# Patient Record
Sex: FEMALE | Race: BLACK OR AFRICAN AMERICAN | NOT HISPANIC OR LATINO
[De-identification: names, ages, dates, MRNs, and addresses within clinical notes are randomized per-mention and may not be internally consistent; named-entity substitution may affect disease eponyms.]

---

## 2017-01-03 ENCOUNTER — APPOINTMENT (OUTPATIENT)
Dept: ENDOCRINOLOGY | Facility: CLINIC | Age: 22
End: 2017-01-03

## 2017-05-16 ENCOUNTER — APPOINTMENT (OUTPATIENT)
Dept: ENDOCRINOLOGY | Facility: CLINIC | Age: 22
End: 2017-05-16

## 2017-05-16 VITALS
HEIGHT: 65.5 IN | WEIGHT: 226 LBS | SYSTOLIC BLOOD PRESSURE: 122 MMHG | HEART RATE: 85 BPM | DIASTOLIC BLOOD PRESSURE: 85 MMHG | BODY MASS INDEX: 37.2 KG/M2

## 2017-05-19 ENCOUNTER — MEDICATION RENEWAL (OUTPATIENT)
Age: 22
End: 2017-05-19

## 2017-05-19 LAB
ANION GAP SERPL CALC-SCNC: 17 MMOL/L
BUN SERPL-MCNC: 10 MG/DL
CALCIUM SERPL-MCNC: 9.6 MG/DL
CHLORIDE SERPL-SCNC: 101 MMOL/L
CO2 SERPL-SCNC: 20 MMOL/L
CREAT SERPL-MCNC: 0.79 MG/DL
GLUCOSE SERPL-MCNC: 80 MG/DL
HBA1C MFR BLD HPLC: 5.4 %
POTASSIUM SERPL-SCNC: 4.5 MMOL/L
SODIUM SERPL-SCNC: 138 MMOL/L
TSH SERPL-ACNC: 20.04 UIU/ML

## 2017-06-23 ENCOUNTER — APPOINTMENT (OUTPATIENT)
Dept: ENDOCRINOLOGY | Facility: CLINIC | Age: 22
End: 2017-06-23

## 2017-07-27 ENCOUNTER — CHART COPY (OUTPATIENT)
Age: 22
End: 2017-07-27

## 2017-08-09 ENCOUNTER — CLINICAL ADVICE (OUTPATIENT)
Age: 22
End: 2017-08-09

## 2017-08-22 ENCOUNTER — APPOINTMENT (OUTPATIENT)
Dept: ENDOCRINOLOGY | Facility: CLINIC | Age: 22
End: 2017-08-22

## 2017-08-30 ENCOUNTER — APPOINTMENT (OUTPATIENT)
Dept: ENDOCRINOLOGY | Facility: CLINIC | Age: 22
End: 2017-08-30

## 2017-09-25 ENCOUNTER — MEDICATION RENEWAL (OUTPATIENT)
Age: 22
End: 2017-09-25

## 2017-10-05 ENCOUNTER — APPOINTMENT (OUTPATIENT)
Dept: ENDOCRINOLOGY | Facility: CLINIC | Age: 22
End: 2017-10-05

## 2017-11-02 ENCOUNTER — APPOINTMENT (OUTPATIENT)
Dept: ENDOCRINOLOGY | Facility: CLINIC | Age: 22
End: 2017-11-02

## 2018-01-12 ENCOUNTER — RESULT REVIEW (OUTPATIENT)
Age: 23
End: 2018-01-12

## 2018-02-02 ENCOUNTER — APPOINTMENT (OUTPATIENT)
Dept: ENDOCRINOLOGY | Facility: CLINIC | Age: 23
End: 2018-02-02
Payer: MEDICAID

## 2018-02-02 VITALS
DIASTOLIC BLOOD PRESSURE: 79 MMHG | HEIGHT: 65.5 IN | BODY MASS INDEX: 41.48 KG/M2 | HEART RATE: 64 BPM | WEIGHT: 252 LBS | SYSTOLIC BLOOD PRESSURE: 123 MMHG

## 2018-02-02 VITALS — WEIGHT: 252 LBS | HEIGHT: 65.5 IN

## 2018-02-02 DIAGNOSIS — Z23 ENCOUNTER FOR IMMUNIZATION: ICD-10-CM

## 2018-02-02 DIAGNOSIS — E55.9 VITAMIN D DEFICIENCY, UNSPECIFIED: ICD-10-CM

## 2018-02-02 PROCEDURE — G0008: CPT

## 2018-02-02 PROCEDURE — 97802 MEDICAL NUTRITION INDIV IN: CPT

## 2018-02-02 PROCEDURE — 90686 IIV4 VACC NO PRSV 0.5 ML IM: CPT

## 2018-02-02 PROCEDURE — 99213 OFFICE O/P EST LOW 20 MIN: CPT | Mod: 25

## 2018-02-02 RX ORDER — INFLUENZA A VIRUS A/VICTORIA/4897/2022 IVR-238 (H1N1) ANTIGEN (FORMALDEHYDE INACTIVATED), INFLUENZA A VIRUS A/DARWIN/9/2021 SAN-010 (H3N2) ANTIGEN (FORMALDEHYDE INACTIVATED), INFLUENZA B VIRUS B/PHUKET/3073/2013 ANTIGEN (FORMALDEHYDE INACTIVATED), AND INFLUENZA B VIRUS B/MICHIGAN/01/2021 ANTIGEN (FORMALDEHYDE INACTIVATED) 15; 15; 15; 15 UG/.5ML; UG/.5ML; UG/.5ML; UG/.5ML
0.5 INJECTION, SUSPENSION INTRAMUSCULAR
Qty: 0.5 | Refills: 0 | Status: ACTIVE | COMMUNITY
Start: 2018-02-02

## 2018-05-02 ENCOUNTER — APPOINTMENT (OUTPATIENT)
Dept: ENDOCRINOLOGY | Facility: CLINIC | Age: 23
End: 2018-05-02
Payer: MEDICAID

## 2018-05-02 VITALS
HEIGHT: 65.5 IN | WEIGHT: 248 LBS | DIASTOLIC BLOOD PRESSURE: 84 MMHG | HEART RATE: 79 BPM | SYSTOLIC BLOOD PRESSURE: 124 MMHG | BODY MASS INDEX: 40.82 KG/M2

## 2018-05-02 PROCEDURE — 99213 OFFICE O/P EST LOW 20 MIN: CPT

## 2018-05-04 LAB
25(OH)D3 SERPL-MCNC: 30.5 NG/ML
ANION GAP SERPL CALC-SCNC: 16 MMOL/L
BUN SERPL-MCNC: 6 MG/DL
CALCIUM SERPL-MCNC: 10 MG/DL
CHLORIDE SERPL-SCNC: 103 MMOL/L
CO2 SERPL-SCNC: 21 MMOL/L
CREAT SERPL-MCNC: 0.94 MG/DL
GLUCOSE SERPL-MCNC: 86 MG/DL
HBA1C MFR BLD HPLC: 5.4 %
POTASSIUM SERPL-SCNC: 4.3 MMOL/L
SODIUM SERPL-SCNC: 140 MMOL/L
T4 FREE SERPL-MCNC: 1.7 NG/DL
TSH SERPL-ACNC: 0.41 UIU/ML

## 2018-06-11 ENCOUNTER — EMERGENCY (EMERGENCY)
Facility: HOSPITAL | Age: 23
LOS: 1 days | Discharge: HOME | End: 2018-06-11
Admitting: EMERGENCY MEDICINE
Payer: COMMERCIAL

## 2018-06-11 VITALS
HEART RATE: 94 BPM | DIASTOLIC BLOOD PRESSURE: 80 MMHG | RESPIRATION RATE: 18 BRPM | OXYGEN SATURATION: 98 % | WEIGHT: 250.45 LBS | SYSTOLIC BLOOD PRESSURE: 117 MMHG | TEMPERATURE: 100 F

## 2018-06-11 LAB
RAPID RVP RESULT: DETECTED
RV+EV RNA SPEC QL NAA+PROBE: DETECTED
S PYO AG SPEC QL IA: NEGATIVE — SIGNIFICANT CHANGE UP

## 2018-06-11 PROCEDURE — 87081 CULTURE SCREEN ONLY: CPT

## 2018-06-11 PROCEDURE — 87581 M.PNEUMON DNA AMP PROBE: CPT

## 2018-06-11 PROCEDURE — 87798 DETECT AGENT NOS DNA AMP: CPT

## 2018-06-11 PROCEDURE — 87880 STREP A ASSAY W/OPTIC: CPT

## 2018-06-11 PROCEDURE — 87486 CHLMYD PNEUM DNA AMP PROBE: CPT

## 2018-06-11 PROCEDURE — 87633 RESP VIRUS 12-25 TARGETS: CPT

## 2018-06-11 PROCEDURE — 99283 EMERGENCY DEPT VISIT LOW MDM: CPT

## 2018-06-11 RX ORDER — IBUPROFEN 200 MG
600 TABLET ORAL ONCE
Qty: 0 | Refills: 0 | Status: COMPLETED | OUTPATIENT
Start: 2018-06-11 | End: 2018-06-11

## 2018-06-11 RX ORDER — FLUTICASONE PROPIONATE 50 MCG
1 SPRAY, SUSPENSION NASAL
Qty: 1 | Refills: 0 | OUTPATIENT
Start: 2018-06-11 | End: 2018-07-10

## 2018-06-11 RX ORDER — IBUPROFEN 200 MG
1 TABLET ORAL
Qty: 15 | Refills: 0 | OUTPATIENT
Start: 2018-06-11 | End: 2018-06-15

## 2018-06-11 RX ADMIN — Medication 600 MILLIGRAM(S): at 17:29

## 2018-06-11 NOTE — ED PROVIDER NOTE - PHYSICAL EXAMINATION
CONSTITUTIONAL: Well-appearing; well-nourished; in no apparent distress.   HEAD: Normocephalic; atraumatic.   EYES: PERRL; EOM intact; conjunctiva and sclera clear  ENT: normal nose; +rhinorrhea, normal pharynx with no erythema or lesions.   NECK: Supple; non-tender;   CARDIOVASCULAR: Normal S1, S2; no murmurs, rubs, or gallops. Regular rate and rhythm.   RESPIRATORY: Breathing easily; breath sounds clear and equal bilaterally; no wheezes, rhonchi, or rales.  MSK: FROM at all extremities, normal tone   EXT: No cyanosis or edema; N/V intact  SKIN: Normal for age and race; warm; dry; good turgor; no apparent lesions or rash.

## 2018-06-11 NOTE — ED ADULT NURSE NOTE - OBJECTIVE STATEMENT
23 year old female patient with c/o of cold like symptoms since yesterday.  NO distress noted.  Breathing easily and unlabored.  A+OX3, ambulatory w steady gait.

## 2018-06-11 NOTE — ED PROVIDER NOTE - OBJECTIVE STATEMENT
22 yo F with pmh of hypothyroidism c/o sore throat, rhinorrhea and bodyaches since yesterday. Pt reports subjective fevers. Denies cough, cp, sob, sick contacts, recent travel. Pt has not taken anything at home for her symptoms. Denies smoking.

## 2018-06-11 NOTE — ED ADULT NURSE NOTE - CHPI ED SYMPTOMS NEG
no weakness/no fever/no vomiting/no dizziness/no chills/no numbness/no pain/no tingling/no nausea/no decreased eating/drinking

## 2018-06-11 NOTE — ED PROVIDER NOTE - MEDICAL DECISION MAKING DETAILS
22 yo F with pmh of hypothyroidism c/o sore throat, rhinorrhea and bodyaches since yesterday. Pt reports subjective fevers. Denies cough, cp, sob, sick contacts, recent travel. T 100.4. Pharynx clear, no exudates. +rhinorrhea. Lungs cta b/l. Likely viral illness. Supportive care 24 yo F with pmh of hypothyroidism c/o sore throat, rhinorrhea and bodyaches since yesterday. Pt reports subjective fevers. Denies cough, cp, sob, sick contacts, recent travel. T 100.4. Pharynx clear, no exudates. +rhinorrhea. Lungs cta b/l. +rhinovirus, supportive care

## 2018-06-15 DIAGNOSIS — J06.9 ACUTE UPPER RESPIRATORY INFECTION, UNSPECIFIED: ICD-10-CM

## 2018-06-15 DIAGNOSIS — J02.9 ACUTE PHARYNGITIS, UNSPECIFIED: ICD-10-CM

## 2018-06-15 DIAGNOSIS — Z91.013 ALLERGY TO SEAFOOD: ICD-10-CM

## 2018-06-15 DIAGNOSIS — B34.8 OTHER VIRAL INFECTIONS OF UNSPECIFIED SITE: ICD-10-CM

## 2018-09-02 ENCOUNTER — EMERGENCY (EMERGENCY)
Facility: HOSPITAL | Age: 23
LOS: 1 days | Discharge: HOME | End: 2018-09-02
Attending: EMERGENCY MEDICINE | Admitting: EMERGENCY MEDICINE
Payer: COMMERCIAL

## 2018-09-02 VITALS
OXYGEN SATURATION: 100 % | RESPIRATION RATE: 16 BRPM | TEMPERATURE: 100 F | DIASTOLIC BLOOD PRESSURE: 75 MMHG | HEART RATE: 83 BPM | WEIGHT: 199.96 LBS | SYSTOLIC BLOOD PRESSURE: 135 MMHG

## 2018-09-02 PROCEDURE — 90715 TDAP VACCINE 7 YRS/> IM: CPT

## 2018-09-02 PROCEDURE — 90471 IMMUNIZATION ADMIN: CPT

## 2018-09-02 PROCEDURE — 99284 EMERGENCY DEPT VISIT MOD MDM: CPT

## 2018-09-02 PROCEDURE — 99283 EMERGENCY DEPT VISIT LOW MDM: CPT | Mod: 25

## 2018-09-02 RX ORDER — TETANUS TOXOID, REDUCED DIPHTHERIA TOXOID AND ACELLULAR PERTUSSIS VACCINE, ADSORBED 5; 2.5; 8; 8; 2.5 [IU]/.5ML; [IU]/.5ML; UG/.5ML; UG/.5ML; UG/.5ML
0.5 SUSPENSION INTRAMUSCULAR ONCE
Qty: 0 | Refills: 0 | Status: COMPLETED | OUTPATIENT
Start: 2018-09-02 | End: 2018-09-02

## 2018-09-02 RX ADMIN — TETANUS TOXOID, REDUCED DIPHTHERIA TOXOID AND ACELLULAR PERTUSSIS VACCINE, ADSORBED 0.5 MILLILITER(S): 5; 2.5; 8; 8; 2.5 SUSPENSION INTRAMUSCULAR at 15:40

## 2018-09-02 NOTE — ED PROVIDER NOTE - MUSCULOSKELETAL, MLM
right foot normal appearance.  no redness/warmth/swelling/focal tenderness on plantar surface where puncture occurred.

## 2018-09-02 NOTE — ED ADULT NURSE NOTE - NSIMPLEMENTINTERV_GEN_ALL_ED
Implemented All Universal Safety Interventions:  [place] to call system. Call bell, personal items and telephone within reach. Instruct patient to call for assistance. Room bathroom lighting operational. Non-slip footwear when patient is off stretcher. Physically safe environment: no spills, clutter or unnecessary equipment. Stretcher in lowest position, wheels locked, appropriate side rails in place.

## 2018-09-02 NOTE — ED PROVIDER NOTE - MEDICAL DECISION MAKING DETAILS
puncture wound of foot from pin. does not appear infected.  will not give antibiotic at this time given has been 2 days and clean pin that didn't go through shoe.  will update tetanus.  return precautions discussed

## 2018-09-02 NOTE — ED PROVIDER NOTE - OBJECTIVE STATEMENT
got poked in right foot with safety pin 2 days ago.  No redness/warmth/drainage.  Cleaned area with soap/water then hydrogen peroxide.  Last tetanus unknown and wants update, wonders if she needs antibiotic

## 2018-09-02 NOTE — ED ADULT NURSE NOTE - OBJECTIVE STATEMENT
Pt presents to ED c/o R foot pain s/p stepping on a safety pin at home x2 days ago. No obvious puncture wound noted to R plantar aspect, no redness/swelling. Pt endorses 3/10 pain to R foot. No fevers/chills, no sensory changes. Pt presents in NAD speaking full sentences ambulatory through triage.

## 2018-09-04 ENCOUNTER — APPOINTMENT (OUTPATIENT)
Dept: ENDOCRINOLOGY | Facility: CLINIC | Age: 23
End: 2018-09-04

## 2018-09-04 ENCOUNTER — MEDICATION RENEWAL (OUTPATIENT)
Age: 23
End: 2018-09-04

## 2018-09-06 DIAGNOSIS — M79.671 PAIN IN RIGHT FOOT: ICD-10-CM
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
## 2018-09-06 DIAGNOSIS — Z79.899 OTHER LONG TERM (CURRENT) DRUG THERAPY: ICD-10-CM

## 2018-09-06 DIAGNOSIS — Z23 ENCOUNTER FOR IMMUNIZATION: ICD-10-CM
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
## 2018-09-06 DIAGNOSIS — Z91.013 ALLERGY TO SEAFOOD: ICD-10-CM
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
## 2018-09-06 DIAGNOSIS — S91.331A PUNCTURE WOUND WITHOUT FOREIGN BODY, RIGHT FOOT, INITIAL ENCOUNTER: ICD-10-CM

## 2018-09-06 DIAGNOSIS — Z79.1 LONG TERM (CURRENT) USE OF NON-STEROIDAL ANTI-INFLAMMATORIES (NSAID): ICD-10-CM

## 2018-09-17 PROBLEM — F32.9 MAJOR DEPRESSIVE DISORDER, SINGLE EPISODE, UNSPECIFIED: Chronic | Status: ACTIVE | Noted: 2018-09-02

## 2019-03-04 ENCOUNTER — APPOINTMENT (OUTPATIENT)
Dept: ENDOCRINOLOGY | Facility: CLINIC | Age: 24
End: 2019-03-04

## 2019-03-13 ENCOUNTER — MEDICATION RENEWAL (OUTPATIENT)
Age: 24
End: 2019-03-13

## 2019-03-27 ENCOUNTER — APPOINTMENT (OUTPATIENT)
Dept: ENDOCRINOLOGY | Facility: CLINIC | Age: 24
End: 2019-03-27

## 2019-04-10 ENCOUNTER — MEDICATION RENEWAL (OUTPATIENT)
Age: 24
End: 2019-04-10

## 2019-05-13 ENCOUNTER — MEDICATION RENEWAL (OUTPATIENT)
Age: 24
End: 2019-05-13

## 2019-06-04 ENCOUNTER — MEDICATION RENEWAL (OUTPATIENT)
Age: 24
End: 2019-06-04

## 2019-07-26 ENCOUNTER — APPOINTMENT (OUTPATIENT)
Dept: ENDOCRINOLOGY | Facility: CLINIC | Age: 24
End: 2019-07-26

## 2019-10-23 ENCOUNTER — APPOINTMENT (OUTPATIENT)
Dept: ENDOCRINOLOGY | Facility: CLINIC | Age: 24
End: 2019-10-23

## 2019-12-06 ENCOUNTER — MEDICATION RENEWAL (OUTPATIENT)
Age: 24
End: 2019-12-06

## 2019-12-23 ENCOUNTER — CLINICAL ADVICE (OUTPATIENT)
Age: 24
End: 2019-12-23

## 2020-01-07 ENCOUNTER — APPOINTMENT (OUTPATIENT)
Dept: ENDOCRINOLOGY | Facility: CLINIC | Age: 25
End: 2020-01-07

## 2020-01-22 ENCOUNTER — APPOINTMENT (OUTPATIENT)
Dept: ENDOCRINOLOGY | Facility: CLINIC | Age: 25
End: 2020-01-22

## 2020-01-31 ENCOUNTER — APPOINTMENT (OUTPATIENT)
Dept: ENDOCRINOLOGY | Facility: CLINIC | Age: 25
End: 2020-01-31

## 2020-02-03 ENCOUNTER — APPOINTMENT (OUTPATIENT)
Dept: ENDOCRINOLOGY | Facility: CLINIC | Age: 25
End: 2020-02-03

## 2020-02-25 ENCOUNTER — APPOINTMENT (OUTPATIENT)
Dept: ENDOCRINOLOGY | Facility: CLINIC | Age: 25
End: 2020-02-25

## 2020-03-17 ENCOUNTER — APPOINTMENT (OUTPATIENT)
Dept: ENDOCRINOLOGY | Facility: CLINIC | Age: 25
End: 2020-03-17

## 2020-03-23 ENCOUNTER — APPOINTMENT (OUTPATIENT)
Dept: ENDOCRINOLOGY | Facility: CLINIC | Age: 25
End: 2020-03-23

## 2020-05-06 ENCOUNTER — APPOINTMENT (OUTPATIENT)
Dept: ENDOCRINOLOGY | Facility: CLINIC | Age: 25
End: 2020-05-06
Payer: MEDICAID

## 2020-05-06 PROCEDURE — 98968 PH1 ASSMT&MGMT NQHP 21-30: CPT

## 2020-05-06 PROCEDURE — 97803 MED NUTRITION INDIV SUBSEQ: CPT

## 2020-05-13 ENCOUNTER — APPOINTMENT (OUTPATIENT)
Dept: ENDOCRINOLOGY | Facility: CLINIC | Age: 25
End: 2020-05-13

## 2020-08-12 ENCOUNTER — APPOINTMENT (OUTPATIENT)
Dept: ENDOCRINOLOGY | Facility: CLINIC | Age: 25
End: 2020-08-12

## 2020-10-09 ENCOUNTER — APPOINTMENT (OUTPATIENT)
Dept: ENDOCRINOLOGY | Facility: CLINIC | Age: 25
End: 2020-10-09

## 2020-11-03 ENCOUNTER — APPOINTMENT (OUTPATIENT)
Dept: ENDOCRINOLOGY | Facility: CLINIC | Age: 25
End: 2020-11-03

## 2020-11-04 ENCOUNTER — APPOINTMENT (OUTPATIENT)
Dept: ENDOCRINOLOGY | Facility: CLINIC | Age: 25
End: 2020-11-04
Payer: MEDICAID

## 2020-11-04 VITALS
HEART RATE: 66 BPM | DIASTOLIC BLOOD PRESSURE: 78 MMHG | HEIGHT: 65.5 IN | SYSTOLIC BLOOD PRESSURE: 116 MMHG | WEIGHT: 269 LBS | BODY MASS INDEX: 44.28 KG/M2

## 2020-11-04 PROCEDURE — 99214 OFFICE O/P EST MOD 30 MIN: CPT | Mod: 25

## 2020-11-04 PROCEDURE — 99072 ADDL SUPL MATRL&STAF TM PHE: CPT

## 2020-11-04 PROCEDURE — 90686 IIV4 VACC NO PRSV 0.5 ML IM: CPT

## 2020-11-04 PROCEDURE — G0008: CPT

## 2020-11-05 NOTE — PHYSICAL EXAM
[Alert] : alert [Healthy Appearance] : healthy appearance [Obese] : obese [No Proptosis] : no proptosis [No Lid Lag] : no lid lag [Normal Hearing] : hearing was normal [No LAD] : no lymphadenopathy [Thyroid Not Enlarged] : the thyroid was not enlarged [Clear to Auscultation] : lungs were clear to auscultation bilaterally [Normal S1, S2] : normal S1 and S2 [Regular Rhythm] : with a regular rhythm [Normal Affect] : the affect was normal [Normal Mood] : the mood was normal [de-identified] : mild acanthosis nigricans

## 2020-11-05 NOTE — HISTORY OF PRESENT ILLNESS
[FreeTextEntry1] : s/p SAENZ for Graves in 2012\par last here in 2018, kept making and cancelling appointments\par now ran out of levothyroxine 2 months ago, but even she wa taking it, wasn't taking every day\par She gets depressed, then eats, and then doesn't take thyroxine because she has already eaten\par having hair problems and feeling cold, keeps her home warm.  Gained weight (she was surprised at how much she weighed)\par says she has never weighed this much.  says she is eating better quality food but it is not balanced.  eats a lot of pre made food that is baked. not exercising.\par taking "Thyroid care" supplement which has helped her hair\par no constipation\par \par SH: was a foster child but now has been on her own for past 2 years. no family support (came here with a friend). living in 'supportive housing' but says it is not supportive at all. incarcerated for much of her teenaged years.\par \par Fh: diabetes, grandfather; mother had GDM when pregnant with patient.\par \par Meds:\par levothyroxine 137 mcg/day -- not taking for past 2 months\par vitamin D weekly\par multivitamin, B12

## 2020-11-05 NOTE — DATA REVIEWED
[FreeTextEntry1] : 12/29:  TSH 9.8, Hb 10.9, tot chol 148, trig 44, HDL 68, LDL 71, Cr 0.86, glu 76, A1c 5.6%\par 5/18: TSH 0.41, A1c 5.4%, free T4 1.7, 25D 30.5\par 1/20: TSH 0.21\par 5/17: TSH 20\par 5/15: TSH 3.34

## 2021-02-09 DIAGNOSIS — E89.0 POSTPROCEDURAL HYPOTHYROIDISM: ICD-10-CM

## 2021-02-09 DIAGNOSIS — E03.9 HYPOTHYROIDISM, UNSPECIFIED: ICD-10-CM

## 2021-06-23 ENCOUNTER — APPOINTMENT (OUTPATIENT)
Dept: ENDOCRINOLOGY | Facility: CLINIC | Age: 26
End: 2021-06-23

## 2021-07-14 ENCOUNTER — APPOINTMENT (OUTPATIENT)
Dept: ENDOCRINOLOGY | Facility: CLINIC | Age: 26
End: 2021-07-14

## 2021-09-01 ENCOUNTER — OUTPATIENT (OUTPATIENT)
Dept: OUTPATIENT SERVICES | Facility: HOSPITAL | Age: 26
LOS: 1 days | End: 2021-09-01
Payer: MEDICAID

## 2021-09-21 DIAGNOSIS — Z71.89 OTHER SPECIFIED COUNSELING: ICD-10-CM

## 2021-10-01 ENCOUNTER — OUTPATIENT (OUTPATIENT)
Dept: OUTPATIENT SERVICES | Facility: HOSPITAL | Age: 26
LOS: 1 days | End: 2021-10-01
Payer: MEDICAID

## 2021-10-30 ENCOUNTER — TRANSCRIPTION ENCOUNTER (OUTPATIENT)
Age: 26
End: 2021-10-30

## 2021-11-03 DIAGNOSIS — Z71.89 OTHER SPECIFIED COUNSELING: ICD-10-CM

## 2021-11-24 ENCOUNTER — APPOINTMENT (OUTPATIENT)
Dept: ENDOCRINOLOGY | Facility: CLINIC | Age: 26
End: 2021-11-24
Payer: MEDICAID

## 2021-11-24 DIAGNOSIS — Z00.00 ENCOUNTER FOR GENERAL ADULT MEDICAL EXAMINATION W/OUT ABNORMAL FINDINGS: ICD-10-CM

## 2021-11-24 PROCEDURE — 99442: CPT

## 2021-11-24 RX ORDER — METFORMIN ER 500 MG 500 MG/1
500 TABLET ORAL DAILY
Qty: 30 | Refills: 5 | Status: DISCONTINUED | COMMUNITY
Start: 2018-05-02 | End: 2021-11-24

## 2021-11-24 NOTE — HISTORY OF PRESENT ILLNESS
[FreeTextEntry1] : s/p SAENZ for Graves in 2012\par says she cannot come on camera\par doesn't know her weight but says she knows she weighs a lot\par taking levothyroxine every day and vitamin D every week.  Getting meds in blister packs so she knows to take it every day\par energy is off and on.  She moves more when she takes the thyroxine every day.\par periods are monthly\par mentally got worse during pandemic.\par has not done labs since 12/2019\par \par SH: was a foster child but now has been on her own for past 2 years. no family support (came here with a friend). living in 'supportive housing' but says it is not supportive at all. incarcerated for much of her teenaged years.\par \par Fh: diabetes, grandfather; mother had GDM when pregnant with patient.\par \par Meds:\par levothyroxine 137 mcg/day \par weekly vitamin D \par multivitamin gummy, B12

## 2021-11-24 NOTE — ASSESSMENT
[FreeTextEntry1] : Hypothyroid due to SAENZ.   goal TSH 0.5-5.0 range\par will send lab form to check labs.  She says she will go to [facility] to do labs and she wants to get flu vaccine also.\par \par Extreme obesity BMI > 40\par Advised to avoid eating late at night (stop eating by 8p) and limit snacking.  Avoid all sugared drinks.  Try to walk 30 min every day\par will check A1c for diabetes/pre diabetes \par \par vitamin D deficiency\par goal 25 D > 25 ng/ml.  continue vitamin D supplementation\par RTO 3-6 months

## 2021-11-29 ENCOUNTER — TRANSCRIPTION ENCOUNTER (OUTPATIENT)
Age: 26
End: 2021-11-29

## 2022-01-26 NOTE — ED ADULT NURSE NOTE - GASTROINTESTINAL WDL
Abdomen soft, nontender, nondistended, bowel sounds present in all 4 quadrants.
Patient lethargic, sedated, not able to participate in exam today. Patient left in bed as found. All lines intact, call bell in reach. MD, RN informed of session/status.

## 2022-03-16 ENCOUNTER — RX RENEWAL (OUTPATIENT)
Age: 27
End: 2022-03-16

## 2022-04-13 ENCOUNTER — APPOINTMENT (OUTPATIENT)
Dept: ENDOCRINOLOGY | Facility: CLINIC | Age: 27
End: 2022-04-13

## 2022-08-24 ENCOUNTER — APPOINTMENT (OUTPATIENT)
Dept: ENDOCRINOLOGY | Facility: CLINIC | Age: 27
End: 2022-08-24

## 2022-08-24 VITALS — HEART RATE: 81 BPM | SYSTOLIC BLOOD PRESSURE: 143 MMHG | DIASTOLIC BLOOD PRESSURE: 84 MMHG | WEIGHT: 276 LBS

## 2022-08-24 PROCEDURE — 99213 OFFICE O/P EST LOW 20 MIN: CPT

## 2022-08-24 NOTE — HISTORY OF PRESENT ILLNESS
[FreeTextEntry1] : s/p SAENZ for Graves in 2012\par was in her apartment for past 2 years due to pandemic, anxiety, agoraphobia.\par She is just starting to come out.  \par She had to move out of her apartment due to mold (she moved on Aug 2)\par she has been taking levothyroxine every day and vitamin D once/week\par periods are regular\par she usually cooks for herself, until she had to move.  She admits she used to eat more vegetables before. \par has pizza some days. \par Meets with therapist and has a good team now to support her.\par \par Fh: diabetes, grandfather; mother had GDM when pregnant with patient.\par \par Meds:\par levothyroxine 137 mcg/day \par weekly vitamin D \par multivitamin gummy, B12

## 2022-08-24 NOTE — PHYSICAL EXAM
[Alert] : alert [Healthy Appearance] : healthy appearance [Obese] : obese [No Proptosis] : no proptosis [No Lid Lag] : no lid lag [Normal Hearing] : hearing was normal [No LAD] : no lymphadenopathy [Thyroid Not Enlarged] : the thyroid was not enlarged [Clear to Auscultation] : lungs were clear to auscultation bilaterally [Normal S1, S2] : normal S1 and S2 [Regular Rhythm] : with a regular rhythm [Acanthosis Nigricans] : acanthosis nigricans present [Normal Affect] : the affect was normal [Normal Mood] : the mood was normal

## 2022-08-24 NOTE — ASSESSMENT
[FreeTextEntry1] : Hypothyroid due to SAENZ.   goal TSH 0.5-5.0 range\par labs is closed now.   Suggested pt get labs done through [facility] or lab near her home.\par \par Extreme obesity BMI > 40\par Advised to avoid eating late at night (stop eating by 8p) and limit snacking.  Avoid all sugared drinks, including cranberry juice. Avoid sugared snacks (applesauce and fruit cups)  Eat more vegetables.   Avoid pizza and bagels.\par will check A1c for diabetes/pre diabetes \par \par vitamin D deficiency\par goal 25 D > 25 ng/ml.  continue vitamin D supplementation\par RTO 1 year if labs are normal.

## 2022-08-28 ENCOUNTER — TRANSCRIPTION ENCOUNTER (OUTPATIENT)
Age: 27
End: 2022-08-28

## 2022-11-01 ENCOUNTER — LABORATORY RESULT (OUTPATIENT)
Age: 27
End: 2022-11-01

## 2022-11-02 LAB
25(OH)D3 SERPL-MCNC: 33.1 NG/ML
ALBUMIN SERPL ELPH-MCNC: 4.9 G/DL
ALP BLD-CCNC: 81 U/L
ALT SERPL-CCNC: 15 U/L
ANION GAP SERPL CALC-SCNC: 13 MMOL/L
AST SERPL-CCNC: 18 U/L
BILIRUB SERPL-MCNC: 0.3 MG/DL
BUN SERPL-MCNC: 9 MG/DL
CALCIUM SERPL-MCNC: 9.7 MG/DL
CHLORIDE SERPL-SCNC: 103 MMOL/L
CO2 SERPL-SCNC: 24 MMOL/L
CREAT SERPL-MCNC: 0.83 MG/DL
EGFR: 100 ML/MIN/1.73M2
ESTIMATED AVERAGE GLUCOSE: 117 MG/DL
GLUCOSE SERPL-MCNC: 74 MG/DL
HBA1C MFR BLD HPLC: 5.7 %
POTASSIUM SERPL-SCNC: 4.2 MMOL/L
PROT SERPL-MCNC: 7.6 G/DL
SODIUM SERPL-SCNC: 140 MMOL/L
TSH SERPL-ACNC: 36.8 UIU/ML

## 2023-03-16 ENCOUNTER — RX RENEWAL (OUTPATIENT)
Age: 28
End: 2023-03-16

## 2023-03-22 ENCOUNTER — APPOINTMENT (OUTPATIENT)
Dept: ENDOCRINOLOGY | Facility: CLINIC | Age: 28
End: 2023-03-22
Payer: MEDICAID

## 2023-03-22 PROCEDURE — 99213 OFFICE O/P EST LOW 20 MIN: CPT | Mod: 95

## 2023-03-22 NOTE — REASON FOR VISIT
[Home] : at home, [unfilled] , at the time of the visit. [Medical Office: ([facility])___] : at the medical office located in  [This encounter was initiated by telehealth (audio with video) and converted to telephone (audio only) due to technical difficulties.] : This encounter was initiated by telehealth (audio with video) and converted to telephone (audio only) due to technical difficulties. [Follow - Up] : a follow-up visit [Hypothyroidism] : hypothyroidism

## 2023-03-22 NOTE — DATA REVIEWED
[FreeTextEntry1] : 11/22  TSH 36.80, 25D 33.1, A1c 5.7%\par 12/29:  TSH 9.8, Hb 10.9, tot chol 148, trig 44, HDL 68, LDL 71, Cr 0.86, glu 76, A1c 5.6%\par 5/18: TSH 0.41, A1c 5.4%, free T4 1.7, 25D 30.5\par 1/20: TSH 0.21\par 5/17: TSH 20\par 5/15: TSH 3.34

## 2023-03-22 NOTE — ASSESSMENT
[FreeTextEntry1] : Hypothyroid due to SAENZ.   goal TSH 0.5-5.0 range\par She will go to [address] lab to recheck TSH.\par will adjust levothyroxine dose, if necessary\par \par Extreme obesity BMI > 40\par Advised to reduce snacking, especially at night. \par try to walk more\par \par vitamin D deficiency. continue vitamin D supplementation\par RTO summer 2024

## 2023-03-22 NOTE — HISTORY OF PRESENT ILLNESS
[FreeTextEntry1] : TEB changed to phone only due to internet connection\par Since November, she has been taking levothyroxine every day and she is feeling better, less sluggish\par She is moving her body more\par She is working on her diet, she still eats sweets but trying to be healthier\par SHe doesn't know her weight but will come in person next visit so she can be weighed.\par \par Fh: diabetes, grandfather; mother had GDM when pregnant with patient.\par PMH:  Graves s/p SAENZ 2012\par \par Meds:\par levothyroxine 137 mcg/day \par weekly vitamin D \par multivitamin gummy, B12

## 2024-03-05 ENCOUNTER — APPOINTMENT (OUTPATIENT)
Dept: ENDOCRINOLOGY | Facility: CLINIC | Age: 29
End: 2024-03-05

## 2024-06-28 ENCOUNTER — RX RENEWAL (OUTPATIENT)
Age: 29
End: 2024-06-28

## 2024-07-02 ENCOUNTER — APPOINTMENT (OUTPATIENT)
Dept: ENDOCRINOLOGY | Facility: CLINIC | Age: 29
End: 2024-07-02
Payer: MEDICAID

## 2024-07-02 PROCEDURE — 99441: CPT

## 2024-09-09 ENCOUNTER — RX RENEWAL (OUTPATIENT)
Age: 29
End: 2024-09-09

## 2024-12-10 ENCOUNTER — LABORATORY RESULT (OUTPATIENT)
Age: 29
End: 2024-12-10

## 2025-02-20 ENCOUNTER — RX RENEWAL (OUTPATIENT)
Age: 30
End: 2025-02-20

## 2025-03-25 ENCOUNTER — APPOINTMENT (OUTPATIENT)
Dept: ENDOCRINOLOGY | Facility: CLINIC | Age: 30
End: 2025-03-25

## 2025-03-28 ENCOUNTER — TRANSCRIPTION ENCOUNTER (OUTPATIENT)
Age: 30
End: 2025-03-28

## 2025-05-01 ENCOUNTER — APPOINTMENT (OUTPATIENT)
Dept: ENDOCRINOLOGY | Facility: CLINIC | Age: 30
End: 2025-05-01

## 2025-06-20 ENCOUNTER — RX RENEWAL (OUTPATIENT)
Age: 30
End: 2025-06-20

## 2025-07-19 ENCOUNTER — NON-APPOINTMENT (OUTPATIENT)
Age: 30
End: 2025-07-19

## 2025-07-24 ENCOUNTER — APPOINTMENT (OUTPATIENT)
Dept: ENDOCRINOLOGY | Facility: CLINIC | Age: 30
End: 2025-07-24
Payer: MEDICARE

## 2025-07-24 DIAGNOSIS — E55.9 VITAMIN D DEFICIENCY, UNSPECIFIED: ICD-10-CM

## 2025-07-24 DIAGNOSIS — E89.0 POSTPROCEDURAL HYPOTHYROIDISM: ICD-10-CM

## 2025-07-24 PROCEDURE — G2211 COMPLEX E/M VISIT ADD ON: CPT | Mod: 2W

## 2025-07-24 PROCEDURE — 99204 OFFICE O/P NEW MOD 45 MIN: CPT | Mod: 2W

## 2025-08-07 ENCOUNTER — TRANSCRIPTION ENCOUNTER (OUTPATIENT)
Age: 30
End: 2025-08-07